# Patient Record
Sex: MALE | Race: WHITE | Employment: FULL TIME | URBAN - METROPOLITAN AREA
[De-identification: names, ages, dates, MRNs, and addresses within clinical notes are randomized per-mention and may not be internally consistent; named-entity substitution may affect disease eponyms.]

---

## 2017-02-26 ENCOUNTER — OFFICE VISIT (OUTPATIENT)
Dept: URGENT CARE | Facility: URGENT CARE | Age: 37
End: 2017-02-26
Payer: COMMERCIAL

## 2017-02-26 VITALS
TEMPERATURE: 98.9 F | WEIGHT: 235 LBS | OXYGEN SATURATION: 95 % | HEIGHT: 74 IN | BODY MASS INDEX: 30.16 KG/M2 | HEART RATE: 82 BPM | SYSTOLIC BLOOD PRESSURE: 120 MMHG | DIASTOLIC BLOOD PRESSURE: 70 MMHG

## 2017-02-26 DIAGNOSIS — H10.022 PINK EYE DISEASE OF LEFT EYE: Primary | ICD-10-CM

## 2017-02-26 PROCEDURE — 99213 OFFICE O/P EST LOW 20 MIN: CPT | Performed by: FAMILY MEDICINE

## 2017-02-26 RX ORDER — TOBRAMYCIN 3 MG/ML
2 SOLUTION/ DROPS OPHTHALMIC 4 TIMES DAILY
Qty: 3 ML | Refills: 0 | Status: SHIPPED | OUTPATIENT
Start: 2017-02-26 | End: 2017-03-05

## 2017-02-26 NOTE — MR AVS SNAPSHOT
"              After Visit Summary   2017    Reagan Camacho    MRN: [id number]           Patient Information     Date Of Birth          1980        Visit Information        Provider Department      2017 9:45 AM Yunior Valadez, DO [facility]        Today's Diagnoses     Pink eye disease of left eye    -  1       Follow-ups after your visit        Who to contact     If you have questions or need follow up information about today's clinic visit or your schedule please contact [facility] directly at [phone].  Normal or non-critical lab and imaging results will be communicated to you by Data[employer] Software[employer], letter or phone within 4 business days after the clinic has received the results. If you do not hear from us within 7 days, please contact the clinic through [employer] or phone. If you have a critical or abnormal lab result, we will notify you by phone as soon as possible.  Submit refill requests through [employer] or call your pharmacy and they will forward the refill request to us. Please allow 3 business days for your refill to be completed.          Additional Information About Your Visit        MyChart Information     [employer] lets you send messages to your doctor, view your test results, renew your prescriptions, schedule appointments and more. To sign up, go to [email or web address] . Click on \"Log in\" on the left side of the screen, which will take you to the Welcome page. Then click on \"Sign up Now\" on the right side of the page.     You will be asked to enter the access code listed below, as well as some personal information. Please follow the directions to create your username and password.     Your access code is: [email or web address]  Expires: 2017 10:21 AM     Your access code will  in 90 days. If you need help or a new code, please call your [place] clinic or [phone].        Care EveryWhere ID     This is your Care EveryWhere ID. " "This could be used by other organizations to access your [place] medical records  [id number]        Your Vitals Were     Pulse Temperature Height Pulse Oximetry BMI (Body Mass Index)       82 98.9  F (37.2  C) (Oral) 6' 2\" (1.88 m) 95% 30.17 kg/m2        Blood Pressure from Last 3 Encounters:   02/26/17 120/70    Weight from Last 3 Encounters:   02/26/17 235 lb (106.6 kg)              Today, you had the following     No orders found for display         Today's Medication Changes          These changes are accurate as of: 2/26/17 10:21 AM.  If you have any questions, ask your nurse or doctor.               Start taking these medicines.        Dose/Directions    tobramycin 0.3 % ophthalmic solution   Commonly known as:  TOBREX   Used for:  Pink eye disease of left eye        Dose:  2 drop   Place 2 drops Into the left eye 4 times daily for 7 days   Quantity:  3 mL   Refills:  0            Where to get your medicines      These medications were sent to [facility] [address] [place] [address] [address] AT [facility] [place]  [address], [facility] [address]     Phone:  [phone]     tobramycin 0.3 % ophthalmic solution                Primary Care Provider    None Specified       No primary provider on file.        Thank you!     Thank you for choosing [facility]  for your care. Our goal is always to provide you with excellent care. Hearing back from our patients is one way we can continue to improve our services. Please take a few minutes to complete the written survey that you may receive in the mail after your visit with us. Thank you!             Your Updated Medication List - Protect others around you: Learn how to safely use, store and throw away your medicines at www.disposemymeds.org.          This list is accurate as of: 2/26/17 10:21 AM.  Always use your most recent med list.                   Brand Name Dispense Instructions for " use    tobramycin 0.3 % ophthalmic solution    TOBREX    3 mL    Place 2 drops Into the left eye 4 times daily for 7 days

## 2017-02-26 NOTE — PROGRESS NOTES
"SUBJECTIVE:Chief Complaint:   Chief Complaint   Patient presents with     Conjunctivitis     1-2 days       History of Present Illness: Reagan Camacho is a 36 year old male who presents complaining of left eye mattering and redness for 1-2 days.  Onset/timing: gradual.   Associated Signs and Symptoms: none   Treatment measures tried include: none   Contact wearer : No    No past medical history on file.  No Known Allergies  Social History   Substance Use Topics     Smoking status: Not on file     Smokeless tobacco: Not on file     Alcohol use Not on file       ROS:  negative for photophobia, pain, vision change    OBJECTIVE:  /70  Pulse 82  Temp 98.9  F (37.2  C) (Oral)  Ht 6' 2\" (1.88 m)  Wt 235 lb (106.6 kg)  SpO2 95%  BMI 30.17 kg/m2   General: no acute distress  Eye exam: right eye normal lid, conjunctiva, cornea, pupil and fundus, left eye abnormal findings: conjunctivitis with erythema, discharge and matting noted.  JOSE, EOMI, fundi normal, corneas normal, no foreign bodies, visual acuity normal both eyes, no periorbital cellulitis      ICD-10-CM    1. Pink eye disease of left eye H10.022 tobramycin (TOBREX) 0.3 % ophthalmic solution       Return to clinic if no improvement or worsening condition.    "

## 2017-02-26 NOTE — NURSING NOTE
"Chief Complaint   Patient presents with     Conjunctivitis     1-2 days        Initial /70  Pulse 82  Temp 98.9  F (37.2  C) (Oral)  Ht 6' 2\" (1.88 m)  Wt 235 lb (106.6 kg)  SpO2 95%  BMI 30.17 kg/m2 Estimated body mass index is 30.17 kg/(m^2) as calculated from the following:    Height as of this encounter: 6' 2\" (1.88 m).    Weight as of this encounter: 235 lb (106.6 kg).  Medication Reconciliation: complete    "

## 2017-06-22 ENCOUNTER — OFFICE VISIT (OUTPATIENT)
Dept: FAMILY MEDICINE | Facility: CLINIC | Age: 37
End: 2017-06-22
Payer: COMMERCIAL

## 2017-06-22 VITALS
BODY MASS INDEX: 30.8 KG/M2 | HEIGHT: 74 IN | OXYGEN SATURATION: 97 % | WEIGHT: 240 LBS | SYSTOLIC BLOOD PRESSURE: 106 MMHG | HEART RATE: 75 BPM | DIASTOLIC BLOOD PRESSURE: 62 MMHG

## 2017-06-22 DIAGNOSIS — F33.42 RECURRENT MAJOR DEPRESSIVE DISORDER, IN FULL REMISSION (H): Primary | ICD-10-CM

## 2017-06-22 PROCEDURE — 99203 OFFICE O/P NEW LOW 30 MIN: CPT | Performed by: INTERNAL MEDICINE

## 2017-06-22 RX ORDER — ALBUTEROL SULFATE 90 UG/1
2 AEROSOL, METERED RESPIRATORY (INHALATION)
COMMUNITY
Start: 2016-09-20

## 2017-06-22 RX ORDER — LANSOPRAZOLE 30 MG/1
30 CAPSULE, DELAYED RELEASE ORAL
COMMUNITY
Start: 2013-06-20 | End: 2018-07-12

## 2017-06-22 ASSESSMENT — ENCOUNTER SYMPTOMS
CONSTIPATION: 0
VOMITING: 0
TREMORS: 0
MYALGIAS: 0
BLURRED VISION: 0
DIZZINESS: 0
HALLUCINATIONS: 0
NERVOUS/ANXIOUS: 0
DIARRHEA: 0
PALPITATIONS: 0
WEIGHT LOSS: 0
NAUSEA: 0
INSOMNIA: 0
DEPRESSION: 0
ABDOMINAL PAIN: 0
HEADACHES: 0

## 2017-06-22 ASSESSMENT — ANXIETY QUESTIONNAIRES
IF YOU CHECKED OFF ANY PROBLEMS ON THIS QUESTIONNAIRE, HOW DIFFICULT HAVE THESE PROBLEMS MADE IT FOR YOU TO DO YOUR WORK, TAKE CARE OF THINGS AT HOME, OR GET ALONG WITH OTHER PEOPLE: NOT DIFFICULT AT ALL
7. FEELING AFRAID AS IF SOMETHING AWFUL MIGHT HAPPEN: NOT AT ALL
1. FEELING NERVOUS, ANXIOUS, OR ON EDGE: SEVERAL DAYS
6. BECOMING EASILY ANNOYED OR IRRITABLE: NOT AT ALL
5. BEING SO RESTLESS THAT IT IS HARD TO SIT STILL: NOT AT ALL
2. NOT BEING ABLE TO STOP OR CONTROL WORRYING: NOT AT ALL
3. WORRYING TOO MUCH ABOUT DIFFERENT THINGS: NOT AT ALL
GAD7 TOTAL SCORE: 1

## 2017-06-22 ASSESSMENT — PATIENT HEALTH QUESTIONNAIRE - PHQ9: 5. POOR APPETITE OR OVEREATING: NOT AT ALL

## 2017-06-22 ASSESSMENT — LIFESTYLE VARIABLES: SUBSTANCE_ABUSE: 0

## 2017-06-22 NOTE — PATIENT INSTRUCTIONS
Taper Fluoxetine as follows: half a tablet alternating with 1 tablet every other day for 1 week, then half a tablet once a day for 1 week, then half a tablet every other day for 1 week, then stop.    Follow up in 4 weeks.

## 2017-06-22 NOTE — MR AVS SNAPSHOT
"              After Visit Summary   6/22/2017    Reagan Camacho    MRN: [id number]           Patient Information     Date Of Birth          1980        Visit Information        Provider Department      6/22/2017 4:30 PM Richar Hedrick MD [facility]        Today's Diagnoses     Recurrent major depressive disorder, in full remission (H)    -  1      Care Instructions      Taper Fluoxetine as follows: half a tablet alternating with 1 tablet every other day for 1 week, then half a tablet once a day for 1 week, then half a tablet every other day for 1 week, then stop.    Follow up in 4 weeks.           Follow-ups after your visit        Who to contact     If you have questions or need follow up information about today's clinic visit or your schedule please contact [facility] directly at [phone].  Normal or non-critical lab and imaging results will be communicated to you by Teepixhart, letter or phone within 4 business days after the clinic has received the results. If you do not hear from us within 7 days, please contact the clinic through Teepixhart or phone. If you have a critical or abnormal lab result, we will notify you by phone as soon as possible.  Submit refill requests through [employer] or call your pharmacy and they will forward the refill request to us. Please allow 3 business days for your refill to be completed.          Additional Information About Your Visit        MyChart Information     [employer] lets you send messages to your doctor, view your test results, renew your prescriptions, schedule appointments and more. To sign up, go to [email or web address] . Click on \"Log in\" on the left side of the screen, which will take you to the Welcome page. Then click on \"Sign up Now\" on the right side of the page.     You will be asked to enter the access code listed below, as well as some personal information. Please follow the directions to create your username and " "password.     Your access code is: [email or web address]  Expires: 2017  4:31 PM     Your access code will  in 90 days. If you need help or a new code, please call your [place] clinic or [phone].        Care EveryWhere ID     This is your Care EveryWhere ID. This could be used by other organizations to access your [place] medical records  [id number]        Your Vitals Were     Pulse Height Pulse Oximetry BMI (Body Mass Index)          75 6' 2\" (1.88 m) 97% 30.81 kg/m2         Blood Pressure from Last 3 Encounters:   17 106/62   17 120/70    Weight from Last 3 Encounters:   17 240 lb (108.9 kg)   17 235 lb (106.6 kg)              Today, you had the following     No orders found for display         Today's Medication Changes          These changes are accurate as of: 17  5:08 PM.  If you have any questions, ask your nurse or doctor.               These medicines have changed or have updated prescriptions.        Dose/Directions    FLUoxetine 20 MG capsule   Commonly known as:  PROzac   This may have changed:  when to take this   Used for:  Recurrent major depressive disorder, in full remission (H)   Changed by:  Richar Hedrick MD        Dose:  20 mg   Take 1 capsule (20 mg) by mouth daily   Quantity:  30 capsule   Refills:  0            Where to get your medicines      These medications were sent to [facility] [address] [place] [address] [address] AT [facility] [place]  [address], [facility] [address]     Phone:  [phone]     FLUoxetine 20 MG capsule                Primary Care Provider Office Phone # Fax #    Richar Hedrick [phone]       [facility] [address] [facility] [address]  [facility] [address]        Equal Access to Services     TERESA TINOCO AH: Hadii parker ku hadasho Soomaali, waaxda luqadaha, qaybta kaalmada adeegyakeith, shaina rojas ah. " So [facility] [phone].    ATENCIÓN: Si patriziala ritu, tiene a ruelas disposición servicios gratuitos de asistencia lingüística. Joe al [phone].    We comply with applicable federal civil rights laws and [place] laws. We do not discriminate on the basis of race, color, national origin, age, disability sex, sexual orientation or gender identity.            Thank you!     Thank you for choosing [facility]  for your care. Our goal is always to provide you with excellent care. Hearing back from our patients is one way we can continue to improve our services. Please take a few minutes to complete the written survey that you may receive in the mail after your visit with us. Thank you!             Your Updated Medication List - Protect others around you: Learn how to safely use, store and throw away your medicines at www.disposemymeds.org.          This list is accurate as of: 6/22/17  5:08 PM.  Always use your most recent med list.                   Brand Name Dispense Instructions for use Diagnosis    albuterol 108 (90 BASE) MCG/ACT Inhaler    PROAIR HFA/PROVENTIL HFA/VENTOLIN HFA     Inhale 2 puffs into the lungs        FLUoxetine 20 MG capsule    PROzac    30 capsule    Take 1 capsule (20 mg) by mouth daily    Recurrent major depressive disorder, in full remission (H)       LANsoprazole 30 MG CR capsule    PREVACID     Take 30 mg by mouth        mometasone-formoterol 100-5 MCG/ACT oral inhaler    DULERA     Inhale 2 puffs into the lungs

## 2017-06-23 ASSESSMENT — PATIENT HEALTH QUESTIONNAIRE - PHQ9: SUM OF ALL RESPONSES TO PHQ QUESTIONS 1-9: 5

## 2017-06-23 ASSESSMENT — ASTHMA QUESTIONNAIRES: ACT_TOTALSCORE: 25

## 2017-06-23 ASSESSMENT — ANXIETY QUESTIONNAIRES: GAD7 TOTAL SCORE: 1

## 2017-06-23 NOTE — PROGRESS NOTES
"HPI Comments:   Patient comes in today to establish care. His previous primary care physician has left the practice.    He has a history of depression for which he has been taking fluoxetine. He feels that he is ready to try weaning off the medication. Denies any significant depression or anxiety at this time.  No significant life stressors at this time.      Past medical history: no history of thyroid disorder      Review of Systems   Constitutional: Negative for malaise/fatigue and weight loss.   Eyes: Negative for blurred vision.   Cardiovascular: Negative for chest pain and palpitations.   Gastrointestinal: Negative for abdominal pain, constipation, diarrhea, nausea and vomiting.   Musculoskeletal: Negative for myalgias.   Neurological: Negative for dizziness, tremors and headaches.   Psychiatric/Behavioral: Negative for depression, hallucinations, substance abuse and suicidal ideas. The patient is not nervous/anxious and does not have insomnia.        /62 (BP Location: Left arm, Patient Position: Chair, Cuff Size: Adult Large)  Pulse 75  Ht 6' 2\" (1.88 m)  Wt 240 lb (108.9 kg)  SpO2 97%  BMI 30.81 kg/m2      Physical Exam   Constitutional: He is oriented to person, place, and time. No distress.   Neck: No thyromegaly present.   Cardiovascular: Normal rate, regular rhythm and normal heart sounds.    Pulmonary/Chest: Effort normal and breath sounds normal. No respiratory distress.   Abdominal: Soft. There is no tenderness.   Neurological: He is alert and oriented to person, place, and time. Coordination normal. GCS score is 15.   Psychiatric: Mood and affect normal.   Vitals reviewed.        ICD-10-CM    1. Recurrent major depressive disorder, in full remission (H) F33.42 FLUoxetine (PROZAC) 20 MG capsule         Patient Instructions     Taper Fluoxetine as follows: half a tablet alternating with 1 tablet every other day for 1 week, then half a tablet once a day for 1 week, then half a tablet every " other day for 1 week, then stop.    Follow up in 4 weeks.

## 2017-07-25 ENCOUNTER — OFFICE VISIT (OUTPATIENT)
Dept: FAMILY MEDICINE | Facility: CLINIC | Age: 37
End: 2017-07-25
Payer: COMMERCIAL

## 2017-07-25 VITALS
WEIGHT: 243 LBS | HEIGHT: 74 IN | TEMPERATURE: 97 F | DIASTOLIC BLOOD PRESSURE: 84 MMHG | BODY MASS INDEX: 31.18 KG/M2 | OXYGEN SATURATION: 95 % | HEART RATE: 67 BPM | SYSTOLIC BLOOD PRESSURE: 122 MMHG

## 2017-07-25 DIAGNOSIS — F33.42 RECURRENT MAJOR DEPRESSIVE DISORDER, IN FULL REMISSION (H): Primary | ICD-10-CM

## 2017-07-25 PROCEDURE — 99213 OFFICE O/P EST LOW 20 MIN: CPT | Performed by: INTERNAL MEDICINE

## 2017-07-25 ASSESSMENT — ENCOUNTER SYMPTOMS
CONSTIPATION: 0
DEPRESSION: 0
NAUSEA: 0
NERVOUS/ANXIOUS: 0
HEADACHES: 0
DIZZINESS: 0
VOMITING: 0
INSOMNIA: 0
ABDOMINAL PAIN: 0
DIARRHEA: 0

## 2017-07-25 NOTE — MR AVS SNAPSHOT
"              After Visit Summary   2017    Reagan Camacho    MRN: [id number]           Patient Information     Date Of Birth          1980        Visit Information        Provider Department      2017 4:30 PM Richar Hedrick MD [facility]        Care Instructions    Call doctor if your depression/anxiety worsens, or if you develop new symptoms.    Follow up 3-4 months for your full physical (come in fasting).            Follow-ups after your visit        Who to contact     If you have questions or need follow up information about today's clinic visit or your schedule please contact [facility] directly at [phone].  Normal or non-critical lab and imaging results will be communicated to you by MyChart, letter or phone within 4 business days after the clinic has received the results. If you do not hear from us within 7 days, please contact the clinic through MyChart or phone. If you have a critical or abnormal lab result, we will notify you by phone as soon as possible.  Submit refill requests through [employer] or call your pharmacy and they will forward the refill request to us. Please allow 3 business days for your refill to be completed.          Additional Information About Your Visit        MyChart Information     Sample[employer] lets you send messages to your doctor, view your test results, renew your prescriptions, schedule appointments and more. To sign up, go to [email or web address] . Click on \"Log in\" on the left side of the screen, which will take you to the Welcome page. Then click on \"Sign up Now\" on the right side of the page.     You will be asked to enter the access code listed below, as well as some personal information. Please follow the directions to create your username and password.     Your access code is: [email or web address]  Expires: 2017  4:31 PM     Your access code will  in 90 days. If you need help or a new code, please call your " "[facility] or [phone].        Care EveryWhere ID     This is your Care EveryWhere ID. This could be used by other organizations to access your [place] medical records  [id number]        Your Vitals Were     Pulse Temperature Height Pulse Oximetry BMI (Body Mass Index)       67 97  F (36.1  C) 6' 2\" (1.88 m) 95% 31.2 kg/m2        Blood Pressure from Last 3 Encounters:   07/25/17 122/84   06/22/17 106/62   02/26/17 120/70    Weight from Last 3 Encounters:   07/25/17 243 lb (110.2 kg)   06/22/17 240 lb (108.9 kg)   02/26/17 235 lb (106.6 kg)              Today, you had the following     No orders found for display       Primary Care Provider Office Phone # Fax #    Richar Baljeet Hedrick [phone]       [facility] [address]  [facility] [address]        Equal Access to Services     TERESA TINOCO : Hadii aad ku hadasho Soomaali, waaxda luqadaha, qaybta kaalmada adeegyada, waxay idiin haytanner rojas . So [facility] [phone].    ATENCIÓN: Si habla español, tiene a ruelas disposición servicios gratuitos de asistencia lingüística. Llame al [phone].    We comply with applicable federal civil rights laws and [place] laws. We do not discriminate on the basis of race, color, national origin, age, disability sex, sexual orientation or gender identity.            Thank you!     Thank you for choosing [facility]  for your care. Our goal is always to provide you with excellent care. Hearing back from our patients is one way we can continue to improve our services. Please take a few minutes to complete the written survey that you may receive in the mail after your visit with us. Thank you!             Your Updated Medication List - Protect others around you: Learn how to safely use, store and throw away your medicines at www.disposemymeds.org.          This list is accurate as of: 7/25/17  4:50 PM.  Always use your most recent med list.          "          Brand Name Dispense Instructions for use Diagnosis    albuterol 108 (90 BASE) MCG/ACT Inhaler    PROAIR HFA/PROVENTIL HFA/VENTOLIN HFA     Inhale 2 puffs into the lungs        FLUoxetine 20 MG capsule    PROzac    30 capsule    Take 1 capsule (20 mg) by mouth daily    Recurrent major depressive disorder, in full remission (H)       LANsoprazole 30 MG CR capsule    PREVACID     Take 30 mg by mouth        mometasone-formoterol 100-5 MCG/ACT oral inhaler    DULERA     Inhale 2 puffs into the lungs

## 2017-07-25 NOTE — PATIENT INSTRUCTIONS
Call doctor if your depression/anxiety worsens, or if you develop new symptoms.    Follow up 3-4 months for your full physical (come in fasting).

## 2017-07-25 NOTE — PROGRESS NOTES
"HPI Comments:   I last saw the patient at the clinic on 6/22/2017.  During that clinic visit, we discussed about his desire to wean off his fluoxetine.. Instructions were given on how to accomplish this and patient is almost done with the weaning off.    Since his last clinic visit, there has been no worsening of his depression or anxiety. His PHQ9 score today is 2. He did not experience any withdrawal symptoms.    No other subjective complaints presented.      Past Medical History:   Diagnosis Date     Recurrent major depressive disorder, in full remission (H) 7/25/2017       Review of Systems   Constitutional: Negative for malaise/fatigue.   Gastrointestinal: Negative for abdominal pain, constipation, diarrhea, nausea and vomiting.   Neurological: Negative for dizziness and headaches.   Psychiatric/Behavioral: Negative for depression. The patient is not nervous/anxious and does not have insomnia.        /84  Pulse 67  Temp 97  F (36.1  C)  Ht 6' 2\" (1.88 m)  Wt 243 lb (110.2 kg)  SpO2 95%  BMI 31.2 kg/m2      Physical Exam   Constitutional: He is oriented to person, place, and time. No distress.   Neck: No thyromegaly present.   Cardiovascular: Normal rate, regular rhythm and normal heart sounds.    Neurological: He is alert and oriented to person, place, and time. GCS score is 15.   No tremors   Psychiatric: Mood and affect normal.   Vitals reviewed.        ICD-10-CM    1. Recurrent major depressive disorder, in full remission (H) F33.42      **please refer to HPI for status of conditions      Patient Instructions   Call doctor if your depression/anxiety worsens, or if you develop new symptoms.    Follow up 3-4 months for your full physical (come in fasting).            "

## 2017-07-26 ASSESSMENT — PATIENT HEALTH QUESTIONNAIRE - PHQ9: SUM OF ALL RESPONSES TO PHQ QUESTIONS 1-9: 2

## 2018-07-12 ENCOUNTER — APPOINTMENT (OUTPATIENT)
Dept: GENERAL RADIOLOGY | Facility: CLINIC | Age: 38
End: 2018-07-12
Attending: EMERGENCY MEDICINE
Payer: COMMERCIAL

## 2018-07-12 ENCOUNTER — HOSPITAL ENCOUNTER (EMERGENCY)
Facility: CLINIC | Age: 38
Discharge: HOME | End: 2018-07-13
Attending: EMERGENCY MEDICINE | Admitting: EMERGENCY MEDICINE
Payer: COMMERCIAL

## 2018-07-12 DIAGNOSIS — R07.89 CHEST DISCOMFORT: ICD-10-CM

## 2018-07-12 DIAGNOSIS — J02.9 PHARYNGITIS, UNSPECIFIED ETIOLOGY: ICD-10-CM

## 2018-07-12 LAB
ANION GAP SERPL CALCULATED.3IONS-SCNC: 8 MMOL/L (ref 3–14)
BASOPHILS # BLD AUTO: 0 10E9/L (ref 0–0.2)
BASOPHILS NFR BLD AUTO: 0.5 %
BUN SERPL-MCNC: 18 MG/DL (ref 7–30)
CALCIUM SERPL-MCNC: 8.9 MG/DL (ref 8.5–10.1)
CHLORIDE SERPL-SCNC: 105 MMOL/L (ref 94–109)
CO2 SERPL-SCNC: 27 MMOL/L (ref 20–32)
CREAT SERPL-MCNC: 1.02 MG/DL (ref 0.66–1.25)
DEPRECATED S PYO AG THROAT QL EIA: NORMAL
DIFFERENTIAL METHOD BLD: ABNORMAL
EOSINOPHIL # BLD AUTO: 0.1 10E9/L (ref 0–0.7)
EOSINOPHIL NFR BLD AUTO: 1.1 %
ERYTHROCYTE [DISTWIDTH] IN BLOOD BY AUTOMATED COUNT: 12.1 % (ref 10–15)
GFR SERPL CREATININE-BSD FRML MDRD: 83 ML/MIN/1.7M2
GLUCOSE SERPL-MCNC: 104 MG/DL (ref 70–99)
HCT VFR BLD AUTO: 39.1 % (ref 40–53)
HGB BLD-MCNC: 13.7 G/DL (ref 13.3–17.7)
IMM GRANULOCYTES # BLD: 0 10E9/L (ref 0–0.4)
IMM GRANULOCYTES NFR BLD: 0.2 %
LYMPHOCYTES # BLD AUTO: 2.6 10E9/L (ref 0.8–5.3)
LYMPHOCYTES NFR BLD AUTO: 46.6 %
MCH RBC QN AUTO: 30.9 PG (ref 26.5–33)
MCHC RBC AUTO-ENTMCNC: 35 G/DL (ref 31.5–36.5)
MCV RBC AUTO: 88 FL (ref 78–100)
MONOCYTES # BLD AUTO: 0.5 10E9/L (ref 0–1.3)
MONOCYTES NFR BLD AUTO: 8.6 %
NEUTROPHILS # BLD AUTO: 2.4 10E9/L (ref 1.6–8.3)
NEUTROPHILS NFR BLD AUTO: 43 %
NRBC # BLD AUTO: 0 10*3/UL
NRBC BLD AUTO-RTO: 0 /100
PLATELET # BLD AUTO: 173 10E9/L (ref 150–450)
POTASSIUM SERPL-SCNC: 3.8 MMOL/L (ref 3.4–5.3)
RBC # BLD AUTO: 4.43 10E12/L (ref 4.4–5.9)
SODIUM SERPL-SCNC: 140 MMOL/L (ref 133–144)
SPECIMEN SOURCE: NORMAL
TROPONIN I SERPL-MCNC: <0.015 UG/L (ref 0–0.04)
WBC # BLD AUTO: 5.6 10E9/L (ref 4–11)

## 2018-07-12 PROCEDURE — 93005 ELECTROCARDIOGRAM TRACING: CPT

## 2018-07-12 PROCEDURE — 87880 STREP A ASSAY W/OPTIC: CPT | Performed by: EMERGENCY MEDICINE

## 2018-07-12 PROCEDURE — 99285 EMERGENCY DEPT VISIT HI MDM: CPT

## 2018-07-12 PROCEDURE — 84484 ASSAY OF TROPONIN QUANT: CPT | Performed by: EMERGENCY MEDICINE

## 2018-07-12 PROCEDURE — 85025 COMPLETE CBC W/AUTO DIFF WBC: CPT | Performed by: EMERGENCY MEDICINE

## 2018-07-12 PROCEDURE — 80048 BASIC METABOLIC PNL TOTAL CA: CPT | Performed by: EMERGENCY MEDICINE

## 2018-07-12 PROCEDURE — 87081 CULTURE SCREEN ONLY: CPT | Performed by: EMERGENCY MEDICINE

## 2018-07-12 PROCEDURE — 25000125 ZZHC RX 250: Performed by: EMERGENCY MEDICINE

## 2018-07-12 PROCEDURE — 71046 X-RAY EXAM CHEST 2 VIEWS: CPT

## 2018-07-12 PROCEDURE — 25000132 ZZH RX MED GY IP 250 OP 250 PS 637: Performed by: EMERGENCY MEDICINE

## 2018-07-12 RX ADMIN — LIDOCAINE HYDROCHLORIDE 30 ML: 20 SOLUTION ORAL; TOPICAL at 23:31

## 2018-07-12 ASSESSMENT — ENCOUNTER SYMPTOMS
SORE THROAT: 1
SHORTNESS OF BREATH: 0
COUGH: 0

## 2018-07-12 NOTE — ED AVS SNAPSHOT
Emergency Department    [address] [facility] [address]    Phone:  [phone]    Fax:  [phone]                                       Reagan Camacho   MRN: [id number]    Department:   Emergency Department   Date of Visit:  7/12/2018           Patient Information     Date Of Birth          1980        Your diagnoses for this visit were:     Chest discomfort     Pharyngitis, unspecified etiology        You were seen by Rodo Dwyer DO.      Follow-up Information     Follow up with Richar Hedrick MD In 5 days.    Specialty:  Internal Medicine    Why:  As needed    Contact information:    [address] [id number] [phone]          Follow up with  Emergency Department.    Specialty:  EMERGENCY MEDICINE    Why:  If symptoms worsen    Contact information:    [address] [facility] [address] [phone]        Discharge Instructions       Recommend Pepcid as needed for breakthrough reflux symptoms    Return to the emergency department or seek medical care as instructed if your symptoms fail to improve or significantly worsen.    Take ibuprofen (aka Motrin/Advil, 600mg up to 4 times per day with food) as needed for symptom/pain relief; use as directed.    Follow-up as indicated on page 1.  Maintain adequate hydration and get plenty of rest.      Discharge References/Attachments     PHARYNGITIS, VIRAL (ENGLISH)    CHEST PAIN, NONCARDIAC  (ENGLISH)      24 Hour Appointment Hotline       To make an appointment at any [facility], call [phone]). If you don't have a family doctor or clinic, we will help you find one. [facility] are conveniently located to serve the needs of you and your family.             Review of your medicines      Our records show that you are taking the medicines listed below. If these are incorrect, please call your family doctor or clinic.        Dose /  Directions Last dose taken    albuterol 108 (90 Base) MCG/ACT Inhaler   Commonly known as:  PROAIR HFA/PROVENTIL HFA/VENTOLIN HFA   Dose:  2 puff        Inhale 2 puffs into the lungs   Refills:  0        mometasone-formoterol 100-5 MCG/ACT oral inhaler   Commonly known as:  DULERA   Dose:  2 puff        Inhale 2 puffs into the lungs   Refills:  0        NEXIUM PO        Refills:  0                Procedures and tests performed during your visit     Basic metabolic panel    Beta strep group A culture    CBC with platelets + differential    Chest XR,  PA & LAT    EKG 12 lead    Rapid strep screen    Troponin I      Orders Needing Specimen Collection     None      Pending Results     Date and Time Order Name Status Description    7/12/2018 2327 Chest XR,  PA & LAT Preliminary     7/12/2018 2325 Beta strep group A culture In process             Pending Culture Results     Date and Time Order Name Status Description    7/12/2018 2325 Beta strep group A culture In process             Pending Results Instructions     If you had any lab results that were not finalized at the time of your Discharge, you can call the ED Lab Result RN at [phone]. You will be contacted by this team for any positive Lab results or changes in treatment. The nurses are available 7 days a week from 10A to 6:30P.  You can leave a message 24 hours per day and they will return your call.        Test Results From Your Hospital Stay        7/12/2018 11:20 PM      Component Results     Component Value Ref Range & Units Status    Troponin I ES <0.015 0.000 - 0.045 ug/L Final    The 99th percentile for upper reference range is 0.045 ug/L.  Troponin values   in the range of 0.045 - 0.120 ug/L may be associated with risks of adverse   clinical events.           7/12/2018 11:16 PM      Component Results     Component Value Ref Range & Units Status    Sodium 140 133 - 144 mmol/L Final    Potassium 3.8 3.4 - 5.3 mmol/L Final    Chloride 105 94 - 109 mmol/L  Final    Carbon Dioxide 27 20 - 32 mmol/L Final    Anion Gap 8 3 - 14 mmol/L Final    Glucose 104 (H) 70 - 99 mg/dL Final    Urea Nitrogen 18 7 - 30 mg/dL Final    Creatinine 1.02 0.66 - 1.25 mg/dL Final    GFR Estimate 83 >60 mL/min/1.7m2 Final    Non  GFR Calc    GFR Estimate If Black >90 >60 mL/min/1.7m2 Final    African American GFR Calc    Calcium 8.9 8.5 - 10.1 mg/dL Final         7/12/2018 11:01 PM      Component Results     Component Value Ref Range & Units Status    WBC 5.6 4.0 - 11.0 10e9/L Final    RBC Count 4.43 4.4 - 5.9 10e12/L Final    Hemoglobin 13.7 13.3 - 17.7 g/dL Final    Hematocrit 39.1 (L) 40.0 - 53.0 % Final    MCV 88 78 - 100 fl Final    MCH 30.9 26.5 - 33.0 pg Final    MCHC 35.0 31.5 - 36.5 g/dL Final    RDW 12.1 10.0 - 15.0 % Final    Platelet Count 173 150 - 450 10e9/L Final    Diff Method Automated Method  Final    % Neutrophils 43.0 % Final    % Lymphocytes 46.6 % Final    % Monocytes 8.6 % Final    % Eosinophils 1.1 % Final    % Basophils 0.5 % Final    % Immature Granulocytes 0.2 % Final    Nucleated RBCs 0 0 /100 Final    Absolute Neutrophil 2.4 1.6 - 8.3 10e9/L Final    Absolute Lymphocytes 2.6 0.8 - 5.3 10e9/L Final    Absolute Monocytes 0.5 0.0 - 1.3 10e9/L Final    Absolute Eosinophils 0.1 0.0 - 0.7 10e9/L Final    Absolute Basophils 0.0 0.0 - 0.2 10e9/L Final    Abs Immature Granulocytes 0.0 0 - 0.4 10e9/L Final    Absolute Nucleated RBC 0.0  Final         7/12/2018 11:47 PM      Component Results     Component    Specimen Description    Throat    Rapid Strep A Screen    NEGATIVE: No Group A streptococcal antigen detected by immunoassay, await culture report.         7/13/2018 12:08 AM      Narrative     XR CHEST 2 VW  7/12/2018 11:47 PM     HISTORY: Chest pain.    COMPARISON: None.    FINDINGS: The heart size is normal. The lungs are clear. No  pneumothorax or pleural effusion.        Impression     IMPRESSION: No acute abnormality.         7/12/2018 11:47 PM                 Clinical Quality Measure: Blood Pressure Screening     Your blood pressure was checked while you were in the emergency department today. The last reading we obtained was  BP: 126/87 . Please read the guidelines below about what these numbers mean and what you should do about them.  If your systolic blood pressure (the top number) is less than 120 and your diastolic blood pressure (the bottom number) is less than 80, then your blood pressure is normal. There is nothing more that you need to do about it.  If your systolic blood pressure (the top number) is 120-139 or your diastolic blood pressure (the bottom number) is 80-89, your blood pressure may be higher than it should be. You should have your blood pressure rechecked within a year by a primary care provider.  If your systolic blood pressure (the top number) is 140 or greater or your diastolic blood pressure (the bottom number) is 90 or greater, you may have high blood pressure. High blood pressure is treatable, but if left untreated over time it can put you at risk for heart attack, stroke, or kidney failure. You should have your blood pressure rechecked by a primary care provider within the next 4 weeks.  If your provider in the emergency department today gave you specific instructions to follow-up with your doctor or provider even sooner than that, you should follow that instruction and not wait for up to 4 weeks for your follow-up visit.        Thank you for choosing [facility]       Thank you for choosing [facility] for your care. Our goal is always to provide you with excellent care. Hearing back from our patients is one way we can continue to improve our services. Please take a few minutes to complete the written survey that you may receive in the mail after you visit with us. Thank you!        [facility] Information     [facility] lets you send messages to your doctor, view your test results, renew your prescriptions, schedule appointments and more. To  "sign up, go to [email or web address] . Click on \"Log in\" on the left side of the screen, which will take you to the Welcome page. Then click on \"Sign up Now\" on the right side of the page.     You will be asked to enter the access code listed below, as well as some personal information. Please follow the directions to create your username and password.     Your access code is: [email or web address]  Expires: 10/11/2018 12:16 AM     Your access code will  in 90 days. If you need help or a new code, please call your [facility] clinic or [phone].        Care EveryWhere ID     This is your Care EveryWhere ID. This could be used by other organizations to access your [facility] medical records  [id number]        Equal Access to Services     TERESA TINOCO : Thuy Serna, taye contreras, yarelis valenzuela, shaina gutiérrez. So [facility] [phone].    ATENCIÓN: Si habla español, tiene a ruelas disposición servicios gratuitos de asistencia lingüística. Llame al [phone].    We comply with applicable federal civil rights laws and [place] laws. We do not discriminate on the basis of race, color, national origin, age, disability, sex, sexual orientation, or gender identity.            After Visit Summary       This is your record. Keep this with you and show to your community pharmacist(s) and doctor(s) at your next visit.                  "

## 2018-07-12 NOTE — ED AVS SNAPSHOT
Emergency Department    [address] [place] [address]    Phone:  [phone]    Fax:  [phone]                                       Reagan Camacho   MRN: [id number]    Department:   Emergency Department   Date of Visit:  7/12/2018           After Visit Summary Signature Page     I have received my discharge instructions, and my questions have been answered. I have discussed any challenges I see with this plan with the nurse or doctor.    ..........................................................................................................................................  Patient/Patient Representative Signature      ..........................................................................................................................................  Patient Representative Print Name and Relationship to Patient    ..................................................               ................................................  Date                                            Time    ..........................................................................................................................................  Reviewed by Signature/Title    ...................................................              ..............................................  Date                                                            Time

## 2018-07-13 VITALS
WEIGHT: 237 LBS | HEIGHT: 74 IN | TEMPERATURE: 97.8 F | DIASTOLIC BLOOD PRESSURE: 87 MMHG | BODY MASS INDEX: 30.42 KG/M2 | OXYGEN SATURATION: 98 % | SYSTOLIC BLOOD PRESSURE: 126 MMHG | RESPIRATION RATE: 16 BRPM

## 2018-07-13 NOTE — ED PROVIDER NOTES
"  History     Chief Complaint:    Chest Pain       HPI   Reagan Camacho is a 37 year old male who presents to the ED for evaluation of chest pain. The patient reports that he has been feeling generally unwell for about a week now with about a sore throat. Today he developed a \"heat\" sensation in his chest. He denies any symptoms of shortness of breath, swelling in his legs, rash, or cough. Of note, he has two young children who have been sick with fevers for the past few days. The patient has no other complaints at this time.     Allergies:  The patient has no known drug allergies.    Medications:    Albuterol  Nexium  Dulera     Past Medical History:    Emphysema of lung  Recurrent major depressive disorder, in full remision as of 7/25/17  GERD    Past Surgical History:    The patient does not have any pertinent past surgical history.    Family History:    No past pertinent family history.    Social History:  Negative for tobacco use.  Positive for alcohol use.   Marital Status:   [2]     Review of Systems   HENT: Positive for sore throat.    Respiratory: Negative for cough and shortness of breath.    Cardiovascular: Positive for chest pain. Negative for leg swelling.   Skin: Negative.    All other systems reviewed and are negative.      Physical Exam     Patient Vitals for the past 24 hrs:   BP Temp Temp src Heart Rate SpO2 Height Weight   07/12/18 2154 (!) 139/92 97.8  F (36.6  C) Temporal 76 97 % 1.88 m (6' 2\") 107.5 kg (237 lb)         Physical Exam  General: Alert and cooperative with exam. Patient in mild distress. Normal mentation.  Head:  Scalp is NC/AT  Eyes:  No scleral icterus, PERRL  ENT:  The external nose and ears are normal. The oropharynx demonstrates posterior erythema with exudate; mucus membranes are moist. Uvula midline, no evidence of deep space infection.  Neck:  Normal range of motion without rigidity.  CV:  Regular rate and rhythm    No pathologic murmur   Resp:  Breath sounds are clear " bilaterally    Non-labored, no retractions or accessory muscle use  GI:  Abdomen is soft, no distension, no tenderness. No peritoneal signs  MS:  No lower extremity edema.  Chest pain not reproducible with anterior palpation of the chest  Skin:  Warm and dry, No rash or lesions noted.  Neuro: Oriented x 3. No gross motor deficits.        Emergency Department Course   ECG:  Indication: Chest pain  Time: 2148  Vent. Rate 67 bpm. SD interval 166. QRS duration 88. QT/QTc 400/422. P-R-T axis 35 22 47.  Normal sinus rhythm. Read time: 2325      Imaging:  Radiographic findings were communicated with the patient who voiced understanding of the findings.  XR Chest, PA & LAT views:   No acute abnormality as per radiology.       Laboratory:  CBC: WBC: 5.6, HGB: 13.7, PLT: 173  BMP: Glucose 104 (H), o/w WNL (Creatinine: 1.02)  2320 Troponin: <0.015    Influenza A/B: A  Rapid strep: negative  Beta strep culture: pending      Interventions:  2331 GI Cocktail 30 mLs PO        Emergency Department Course:  Nursing notes and vitals reviewed. (2319) I performed an exam of the patient as documented above.     IV inserted. Medicine administered as documented above. Blood drawn. This was sent to the lab for further testing, results above.     EKG obtained in the ED, see results above.     The patient was sent for a Chest XR while in the emergency department, findings above.     0005 I rechecked the patient and discussed the results of his workup thus far.     Findings and plan explained to the Patient. Patient discharged home with instructions regarding supportive care, medications, and reasons to return. The importance of close follow-up was reviewed.     I personally reviewed the laboratory results with the Patient and answered all related questions prior to discharge.       Impression & Plan    Medical Decision Making:  Patient is a 37-year-old male who presents with several day history of chest discomfort, sore throat, and  subjective fever.  Patient's medical history and records were reviewed.  Initial consideration for, but not limited to, pericarditis, atypical ACS/MI, esophageal spasm/GERD, strep throat, viral pharyngitis, viral syndrome, costochondritis/MSK pain, among others.  Labs, EKG, and imaging was obtained.  Patient is PERC negative; PE unlikely.  Chest x-ray without significant findings.  Labs unremarkable as noted above including negative troponin.  EKG demonstrated no evidence of acute ischemia, infarction, or arrhythmia.  Pain was not reproducible to palpation.  Patient did demonstrate posterior oropharynx erythema and strep test was obtained and negative; culture pending.  He was provided GI cocktail with noted improvement in symptoms; possible component of reflux/esophageal spasm.  Additionally, as patient noted a viral type illness in other family members over the last few days, presentation may be in part related to a viral syndrome.  Recommended supportive care with ibuprofen and Pepcid as needed for breakthrough reflux symptoms (patient takes Nexium).  Recommended close follow-up with PCP as needed.  Return precautions discussed.  Patient discharged home.  At the time of discharge patient was hemodynamically stable, neurologically intact, afebrile, and pain is well controlled.    Diagnosis:    ICD-10-CM    1. Chest discomfort R07.89    2. Pharyngitis, unspecified etiology J02.9        Disposition:  discharged to home    Scribe Disclosure:  IMykel, am serving as a scribe on 7/12/2018 at 11:19 PM to personally document services performed by Rodo Dwyer DO, based on my observations and the provider's statements to me.        Mykel Gonzales  7/12/2018    EMERGENCY DEPARTMENT       Rodo Dwyer DO  07/13/18 0313

## 2018-07-13 NOTE — DISCHARGE INSTRUCTIONS
Recommend Pepcid as needed for breakthrough reflux symptoms    Return to the emergency department or seek medical care as instructed if your symptoms fail to improve or significantly worsen.    Take ibuprofen (aka Motrin/Advil, 600mg up to 4 times per day with food) as needed for symptom/pain relief; use as directed.    Follow-up as indicated on page 1.  Maintain adequate hydration and get plenty of rest.

## 2018-07-15 LAB
BACTERIA SPEC CULT: NORMAL
Lab: NORMAL
SPECIMEN SOURCE: NORMAL

## 2018-07-16 LAB — INTERPRETATION ECG - MUSE: NORMAL

## 2019-08-01 ENCOUNTER — HOSPITAL ENCOUNTER (OUTPATIENT)
Dept: GENERAL RADIOLOGY | Facility: CLINIC | Age: 39
Discharge: HOME | End: 2019-08-01
Attending: INTERNAL MEDICINE | Admitting: INTERNAL MEDICINE
Payer: COMMERCIAL

## 2019-08-01 DIAGNOSIS — R13.19 ESOPHAGEAL DYSPHAGIA: ICD-10-CM

## 2019-08-01 DIAGNOSIS — K31.7 GASTRIC POLYP: ICD-10-CM

## 2019-08-01 DIAGNOSIS — K31.9 ANTRAL ERYTHEMA: ICD-10-CM

## 2019-08-01 DIAGNOSIS — I89.0 INTESTINAL LYMPHANGIECTASIA: ICD-10-CM

## 2019-08-01 PROCEDURE — 25500045 ZZH RX 255: Performed by: RADIOLOGY

## 2019-08-01 PROCEDURE — 74220 X-RAY XM ESOPHAGUS 1CNTRST: CPT

## 2019-08-01 RX ADMIN — ANTACID/ANTIFLATULENT 4 G: 380; 550; 10; 10 GRANULE, EFFERVESCENT ORAL at 07:55

## 2021-10-12 ENCOUNTER — LAB (OUTPATIENT)
Dept: LAB | Facility: CLINIC | Age: 41
End: 2021-10-12
Attending: OBSTETRICS & GYNECOLOGY
Payer: COMMERCIAL

## 2021-10-12 DIAGNOSIS — Z83.49 FAMILY HISTORY OF CYSTIC FIBROSIS: ICD-10-CM

## 2021-10-12 PROCEDURE — 36415 COLL VENOUS BLD VENIPUNCTURE: CPT

## 2021-10-12 NOTE — PROGRESS NOTES
[facility] [place]  Genetic Counseling Consult    Patient:  Reagan Camacho YOB: 1980   Date of Service:  10/12/21      Reagan was seen at the [facility] for genetic consultation to discuss the option of carrier screening.         Impression/Plan:   1. Reagan elected to pursue expanded carrier screening as part of his partner, Brionna's ongoing pregnancy management. A sample was drawn today and sent to [facility] laboratory. Results are expected within 2-3 weeks. We will contact him to discuss the results. Reagan provided verbal permission for results to be left in his voicemail. Authorization to share protected health information was signed today for us to discuss results with his partner.       Expanded carrier screening for mutations in a large panel of genes associated with autosomal recessive conditions including cystic fibrosis, thalassemias, hearing loss, spinal muscular atrophy, and others, is now available. We also reviewed that expanded carrier screening can assess for common X-linked recessive disorders such as Fragile X syndrome.       We discussed that expanded carrier screening is designed to identify carrier status for conditions that are primarily childhood or adolescent onset. Expanded carrier screening does not evaluate for adult-onset conditions such as hereditary cancer syndromes, dementia/ Alzheimer's disease, or cardiovascular disease risk factors. Additionally, expanded carrier screening is not comprehensive for all known genetic diseases or inherited conditions. This is a screening test, and residual carrier status risk figures will be provided to the patient after results become available. Carrier screening is not meant to diagnose the patient with a condition, and generally carriers are asymptomatic. However, certain genes may confer increased risks for various health concerns in carriers (CHANDLER, DMD, FMR1).      We  discussed that [facility] will report on pseudodeficiency alleles, which are benign variants that are not known to be associated with disease and are not thought to impact the individuals risk to be a carrier for these conditions. However, the presence of pseudodeficiency alleles can exhibit false positive results on biochemical tests such as  screen. We also reviewed that [facility] will report the common 5T CFTR allele in isolation.     Reagan reports a family history of cystic fibrosis carrier status, which I reviewed in depth today. Please see partner's chart for additional information.       It was a pleasure to be involved with Reagan sanchez. Face-to-face time of the meeting was 20 minutes.    Lisa Joyner MS, [facility]  Licensed Genetic Counselor  [facility]  Maternal Fetal Medicine  [email or web address]  [phone]

## 2021-10-25 ENCOUNTER — TELEPHONE (OUTPATIENT)
Dept: MATERNAL FETAL MEDICINE | Facility: CLINIC | Age: 41
End: 2021-10-25

## 2021-11-01 LAB — SCANNED LAB RESULT: ABNORMAL

## 2021-11-01 NOTE — TELEPHONE ENCOUNTER
2021     I left Reagan's partner, Brionna, a voicemail to discuss the results of the couple's carrier screening as agreed upon.      Brionna was NOT identified to be a carrier of any of the 79 conditions screened. This significantly reduces the couple's reproductive risk for these conditions.      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 The most severe CFTR disorder is cystic fibrosis (CF).    CF is typically a childhood-onset disease in which abnormally thick mucus production can cause recurrent respiratory infections and progressive lung disease. It can also cause pancreatic insufficiency. Affected males may also have congenital absence of the vas deferens.     This particular variant is known to have low penetrance and is NOT associated with cystic fibrosis. However, if inherited along with a severe pathogenic variant, it is associated with CAVD in approximately one third of males. Non-classic respiratory disease has also been reported.     Since Brionna was NOT identified to be a carrier of a CFTR mutation, the couple's residual reproductive risk for a CFTR-related disorder is 1 in 3200.     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 This leads to reduced bone mineralization. There are five different types of hypophosphatasia, which range in age of onset and severity.     The  form of the condition is the most severe. It typically presents with severe skeletal defects, seizures, cardiac concerns, and breathing difficulties. It is often fatal at birth.     The infantile form of the condition typically presents before six months of age and is characterized by skeletal  defects, fractures, failure to thrive, hypercalcemia, kidney damage, breathing concerns, seizures, and developmental delays. It is fatal in approximately 50% of cases.     The childhood form presents after six months of age and is highly variable. There tend to be less severe skeletal manifestations than in the infantile form.     The adult form generally presents in middle age with the loss of permanent teeth and the softening of the bones.     Odontohypophosphatasia is the mildest form of the condition, characterized by premature tooth loss.     Sometimes carriers of autosomal recessive hypophosphatasia can have adult-onset, mild symptoms, or no symptoms at all.     Since Brionna was NOT identified to be a carrier of this condition, the couple's residual reproductive risk is 1 in [id number].    Since Reagan COULD be at risk for adult-onset complications due to his carrier status, it is recommended that he share this information with his primary care provider and his dentist.      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     Age of onset of MCAD deficiency is variable. Symptoms typically present during early childhood, although presentation can occur anywhere from infancy to adulthood.    Symptoms can include lack of energy (lethargy), vomiting, and extreme low blood sugar (hypoglycemia). If the condition is untreated, more serious complications can arise, including liver problems, breathing difficulties, seizures, brain damage, coma, and sudden death. With treatment, including frequent feedings to avoid prolonged period of fasting, outcomes are greatly improved.    Since Brionna was NOT identified to be a carrier of this condition, the couple's residual reproductive risk is 1 in 15,600.     I encouraged the couple to call me with  any questions. I also encouraged Reagan to share this information with his siblings who could also be carriers for these conditions. Additionally, any of the couple's children have a 50% chance of being a carrier of any of these conditions. This is information that should be discussed with their children as it may impact their own reproductive risk.      Lisa Joyner MS, [facility]  Licensed Genetic Counselor  [facility]  Maternal Fetal Medicine  [email or web address]  [phone]

## 2025-03-27 ENCOUNTER — TELEPHONE (OUTPATIENT)
Dept: SLEEP MEDICINE | Facility: CLINIC | Age: 45
End: 2025-03-27
Payer: COMMERCIAL

## 2025-03-27 NOTE — TELEPHONE ENCOUNTER
Pt was called to reschedule appt with Antonia Schuler on 4/21/25. LVM with phone number to call asking them to call back.           Francy Villa